# Patient Record
Sex: FEMALE | Race: BLACK OR AFRICAN AMERICAN | NOT HISPANIC OR LATINO | Employment: STUDENT | URBAN - METROPOLITAN AREA
[De-identification: names, ages, dates, MRNs, and addresses within clinical notes are randomized per-mention and may not be internally consistent; named-entity substitution may affect disease eponyms.]

---

## 2018-06-28 ENCOUNTER — OFFICE VISIT (OUTPATIENT)
Dept: OBSTETRICS AND GYNECOLOGY | Facility: CLINIC | Age: 23
End: 2018-06-28
Payer: COMMERCIAL

## 2018-06-28 VITALS
HEIGHT: 60 IN | BODY MASS INDEX: 30.92 KG/M2 | SYSTOLIC BLOOD PRESSURE: 120 MMHG | DIASTOLIC BLOOD PRESSURE: 64 MMHG | WEIGHT: 157.5 LBS

## 2018-06-28 DIAGNOSIS — N89.8 VAGINAL DISCHARGE: Primary | ICD-10-CM

## 2018-06-28 PROCEDURE — 87480 CANDIDA DNA DIR PROBE: CPT

## 2018-06-28 PROCEDURE — 87510 GARDNER VAG DNA DIR PROBE: CPT

## 2018-06-28 PROCEDURE — 3008F BODY MASS INDEX DOCD: CPT | Mod: CPTII,S$GLB,, | Performed by: NURSE PRACTITIONER

## 2018-06-28 PROCEDURE — 99999 PR PBB SHADOW E&M-NEW PATIENT-LVL III: CPT | Mod: PBBFAC,,, | Performed by: NURSE PRACTITIONER

## 2018-06-28 PROCEDURE — 99203 OFFICE O/P NEW LOW 30 MIN: CPT | Mod: S$GLB,,, | Performed by: NURSE PRACTITIONER

## 2018-06-28 RX ORDER — HYDROGEN PEROXIDE 3 %
20 SOLUTION, NON-ORAL MISCELLANEOUS
COMMUNITY
End: 2018-07-23

## 2018-06-28 RX ORDER — DICYCLOMINE HYDROCHLORIDE 10 MG/1
10 CAPSULE ORAL
COMMUNITY
End: 2018-07-23

## 2018-06-29 LAB
CANDIDA RRNA VAG QL PROBE: NEGATIVE
G VAGINALIS RRNA GENITAL QL PROBE: POSITIVE
T VAGINALIS RRNA GENITAL QL PROBE: NEGATIVE

## 2018-06-29 RX ORDER — TINIDAZOLE 500 MG/1
TABLET ORAL
Qty: 8 TABLET | Refills: 0 | Status: SHIPPED | OUTPATIENT
Start: 2018-06-29 | End: 2018-07-23

## 2018-06-29 NOTE — PROGRESS NOTES
Your vaginal swab came back (+) for Bacterial Vaginosis.  This is NOT an STD.  This is similar to a yeast infection but instead of yeast, it's an overgrowth of vaginal bacteria.  The best way to treat this is with an antibiotic.  You cannot drink alcohol while taking this medication or for 3 days after completion of medication - this is very important.    I have sent in a prescription straight to your pharmacy.  Any symptoms of vaginal odor and/or odor should go away after completion of medication.  If you have any other questions please call the office.    Sincerely,   PEPPER Rodríguez

## 2018-07-09 NOTE — PROGRESS NOTES
Chief Complaint   Patient presents with    Well Woman     last pap 10/17 neg     vaginal odor       (New patient to our practice:  Will become a Dr. Zazueta patient)    Last PAP:  10/2017   Normal,  HPV n/a      Silvia TEDDY Bose is a 23 y.o. female No obstetric history on file. presents with complaint of vaginal odor recently that has been bothersome.  She denies itching.   She states the discharge is white.  Patient's last menstrual period was 06/16/2018 (exact date)..  Has Nexplanon which was placed 12/2016 to left upper arm.  Ms. Bose is currently sexually active with a single male partner.  She declines STD screening today.    Past Medical History:   Diagnosis Date    Abnormal Pap smear of cervix     2011     Acid reflux     History of stomach ulcers     Hx of chlamydia infection      History reviewed. No pertinent surgical history.  Social History   Substance Use Topics    Smoking status: Never Smoker    Smokeless tobacco: Never Used    Alcohol use Yes     Family History   Problem Relation Age of Onset    Fibroids Mother     Breast cancer Neg Hx     Colon cancer Neg Hx     Diabetes Neg Hx     Hypertension Neg Hx     Ovarian cancer Neg Hx      OB History   No data available         ROS:  GENERAL: No fever, chills, fatigue or weight loss.  VULVAR: denies pain, denies lesions and denies itching.  VAGINAL: denies itching, denies abnormal bleeding and denies lesions.  ABDOMEN: denies abdominal pain. Denies nausea. Denies vomiting. No diarrhea. No constipation  BREAST: Denies pain. No lumps. No discharge.  URINARY: No incontinence, no nocturia, no frequency and no dysuria.  CARDIOVASCULAR: No chest pain. No shortness of breath. No leg cramps.  NEUROLOGICAL: No headaches. No vision changes.    Vitals:    06/28/18 1517   BP: 120/64   Weight: 71.5 kg (157 lb 8.3 oz)   Height: 5' (1.524 m)   PainSc: 0-No pain     Body mass index is 30.76 kg/m².    PHYSICAL EXAM:   External genitalia: normal external  genitalia, no erythema, no discharge  and urethra: normal appearing urethra with no masses, tenderness or lesions.   Vagina: normal appearing vagina with normal color and small amount white milky, malodorous discharge, no lesions.    Cervix: normal appearing cervix without discharge or lesions, cervical motion tenderness absent.   Uterus: uterus is normal size, shape, consistency and nontender, mobile. Adnexa:  normal adnexa and no mass, fullness, tenderness:  .    ASSESSMENT and PLAN:  Vaginal discharge  -     Vaginosis Screen by DNA Probe    Other orders  -     tinidazole (TINDAMAX) 500 MG tablet; Take 4 pills by mouth at once x 2 days.  Dispense: 8 tablet; Refill: 0    * Will call with results when finalized.    * Use of the [facility] Patient Portal discussed and encouraged during today's visit.       FOLLOW UP: Follow-up in about 4 weeks (around 7/26/2018) for Annual (to [facility] with Dr. Zazueta).

## 2018-07-23 ENCOUNTER — TELEPHONE (OUTPATIENT)
Dept: OBSTETRICS AND GYNECOLOGY | Facility: CLINIC | Age: 23
End: 2018-07-23

## 2018-07-23 ENCOUNTER — OFFICE VISIT (OUTPATIENT)
Dept: URGENT CARE | Facility: CLINIC | Age: 23
End: 2018-07-23
Payer: COMMERCIAL

## 2018-07-23 VITALS
DIASTOLIC BLOOD PRESSURE: 72 MMHG | WEIGHT: 160 LBS | RESPIRATION RATE: 18 BRPM | TEMPERATURE: 97 F | HEIGHT: 60 IN | BODY MASS INDEX: 31.41 KG/M2 | OXYGEN SATURATION: 100 % | SYSTOLIC BLOOD PRESSURE: 103 MMHG | HEART RATE: 83 BPM

## 2018-07-23 DIAGNOSIS — N92.6 IRREGULAR BLEEDING: Primary | ICD-10-CM

## 2018-07-23 DIAGNOSIS — S91.312A LACERATION OF LEFT FOOT, INITIAL ENCOUNTER: Primary | ICD-10-CM

## 2018-07-23 PROCEDURE — 99203 OFFICE O/P NEW LOW 30 MIN: CPT | Mod: 25,S$GLB,, | Performed by: NURSE PRACTITIONER

## 2018-07-23 PROCEDURE — 90715 TDAP VACCINE 7 YRS/> IM: CPT | Mod: S$GLB,,, | Performed by: NURSE PRACTITIONER

## 2018-07-23 PROCEDURE — 90471 IMMUNIZATION ADMIN: CPT | Mod: S$GLB,,, | Performed by: NURSE PRACTITIONER

## 2018-07-23 RX ORDER — SULFAMETHOXAZOLE AND TRIMETHOPRIM 800; 160 MG/1; MG/1
1 TABLET ORAL 2 TIMES DAILY
Qty: 20 TABLET | Refills: 0 | Status: SHIPPED | OUTPATIENT
Start: 2018-07-23 | End: 2018-08-02

## 2018-07-23 RX ORDER — MUPIROCIN 20 MG/G
OINTMENT TOPICAL 3 TIMES DAILY
Qty: 22 G | Refills: 1 | Status: SHIPPED | OUTPATIENT
Start: 2018-07-23 | End: 2018-07-26

## 2018-07-23 NOTE — PATIENT INSTRUCTIONS
USE THE BACTROBAN THREE TIMES A DAY FOR THREE DAYS THEN STOP    TAKE THE BACTRIM TWICE A DAY FOR 10 DAYS     DO NOT GO IN ANY DIRTY ENVIRONMENTS WATER OR DIRT      Old Laceration: Not Sutured  A laceration is a cut through the skin. This will usually require stitches if it is deep. However, if a laceration remains open for too long, the risk of infection increases. In your case, too much time has passed before coming for treatment. The danger of infection from suturing at this time is too high. That is why your wound was not sutured.  If the wound is spread open, it will heal by filling in from the bottom and sides. A wound that is not sutured may take 1 to 4 weeks to heal, depending on the size of the opening. A visible scar will probably occur. You can discuss revision of the scar with your healthcare provider at a later time.  Home care  The following guidelines will help you care for your laceration at home:  · Keep the wound clean and dry. If a bandage was applied and it becomes wet or dirty, replace it. Otherwise, leave it in place for the first 24 hours, then change it once a day or as directed.  · Clean the wound daily:  ¨ After removing any bandage, wash the area with soap and water. Use a wet cotton swab to loosen and remove any blood or crust that forms.  ¨ Talk with your doctor before applying any antibiotic ointment to the wound. Reapply a fresh bandage.  ¨ You may remove the bandage to shower as usual after the first 24 hours, but do not soak the area in water (no tub baths or swimming) for the next five days. Avoid activities that may reinjure your wound.  · The healthcare provider may prescribe an antibiotic cream or ointment to prevent infection. Do not stop using this medication until you have finished the prescribed course or the provider tells you to stop.  · The healthcare provider may also prescribe medications for pain. Follow instructions for taking these medications.  · Check the wound  daily for signs of infection listed below.  Follow-up care  Follow up with your healthcare provider as advised.  When to seek medical advice  Call your healthcare provider right away if any of these occur:  · Wound bleeding not controlled by direct pressure  · Signs of infection, including increasing pain in the wound, increasing wound redness or swelling, or pus or bad odor coming from the wound  · Fever of 100.4°F (38.ºC) or higher or as directed by your healthcare provider  · Wound edges re-open  · Wound changes colors  · Numbness around the wound   · Decreased movement around the injured area  Date Last Reviewed: 6/10/2015  © 3284-6220 [employer]. [address] [place] [address], [place] [address]. All rights reserved. This information is not intended as a substitute for professional medical care. Always follow your healthcare professional's instructions.

## 2018-07-23 NOTE — PROGRESS NOTES
Subjective:       Patient ID: Silvia Bose is a 23 y.o. female.    Vitals:  height is 5' (1.524 m) and weight is 72.6 kg (160 lb). Her oral temperature is 97.4 °F (36.3 °C). Her blood pressure is 103/72 and her pulse is 83. Her respiration is 18 and oxygen saturation is 100%.     Chief Complaint: Foot Injury    Patient states she stepped on something while tubing in a river and was unable to see what the object was. This occurred over 24 hours ago. This occurred on her left foot. Pt reports some TTP but denies any pain with ambulation. Last tetanus was in 2006.      Foot Injury    The incident occurred 12 to 24 hours ago. Injury mechanism: patient states she stepped on something in the water. The pain is present in the left foot. The quality of the pain is described as aching. The pain is at a severity of 0/10. The patient is experiencing no pain. Associated symptoms include an inability to bear weight. Pertinent negatives include no loss of motion, loss of sensation, muscle weakness, numbness or tingling. It is unknown if a foreign body is present. The symptoms are aggravated by weight bearing and movement. She has tried nothing for the symptoms. The treatment provided no relief.     Review of Systems   Constitution: Negative for weakness and malaise/fatigue.   HENT: Negative for nosebleeds.    Cardiovascular: Negative for chest pain and syncope.   Respiratory: Negative for shortness of breath.    Musculoskeletal: Positive for joint pain, joint swelling and stiffness. Negative for arthritis, back pain, gout and neck pain.   Gastrointestinal: Negative for abdominal pain.   Genitourinary: Negative for hematuria.   Neurological: Negative for dizziness, numbness and tingling.       Objective:      Physical Exam   Constitutional: She is oriented to person, place, and time. She appears well-developed and well-nourished.   HENT:   Head: Normocephalic and atraumatic. Head is without abrasion, without contusion and without  laceration.   Right Ear: External ear normal.   Left Ear: External ear normal.   Nose: Nose normal.   Mouth/Throat: Oropharynx is clear and moist.   Eyes: Conjunctivae, EOM and lids are normal. Pupils are equal, round, and reactive to light.   Neck: Trachea normal, full passive range of motion without pain and phonation normal. Neck supple.   Cardiovascular: Normal rate, regular rhythm and normal heart sounds.    Pulmonary/Chest: Effort normal and breath sounds normal. No stridor. No respiratory distress.   Musculoskeletal: Normal range of motion.   Neurological: She is alert and oriented to person, place, and time.   Skin: Skin is warm and dry. Capillary refill takes less than 2 seconds. Laceration noted. No abrasion, no bruising, no burn, no ecchymosis, no lesion and no rash noted. No erythema.   1.5cm linear laceration with edges well approximated with erythema and induration surrounding the lac. Laceration cleaned and dressed with bactroban   Psychiatric: She has a normal mood and affect. Her speech is normal and behavior is normal. Judgment and thought content normal. Cognition and memory are normal.   Nursing note and vitals reviewed.      Assessment:       1. Laceration of left foot, initial encounter        Plan:         Laceration of left foot, initial encounter  -     (In Office Administered) Tdap Vaccine  -     mupirocin (BACTROBAN) 2 % ointment; Apply topically 3 (three) times daily. for 3 days  Dispense: 22 g; Refill: 1  -     sulfamethoxazole-trimethoprim 800-160mg (BACTRIM DS) 800-160 mg Tab; Take 1 tablet by mouth 2 (two) times daily. for 10 days  Dispense: 20 tablet; Refill: 0      Patient Instructions         USE THE BACTROBAN THREE TIMES A DAY FOR THREE DAYS THEN STOP    TAKE THE BACTRIM TWICE A DAY FOR 10 DAYS     DO NOT GO IN ANY DIRTY ENVIRONMENTS WATER OR DIRT      Old Laceration: Not Sutured  A laceration is a cut through the skin. This will usually require stitches if it is deep. However, if  a laceration remains open for too long, the risk of infection increases. In your case, too much time has passed before coming for treatment. The danger of infection from suturing at this time is too high. That is why your wound was not sutured.  If the wound is spread open, it will heal by filling in from the bottom and sides. A wound that is not sutured may take 1 to 4 weeks to heal, depending on the size of the opening. A visible scar will probably occur. You can discuss revision of the scar with your healthcare provider at a later time.  Home care  The following guidelines will help you care for your laceration at home:  · Keep the wound clean and dry. If a bandage was applied and it becomes wet or dirty, replace it. Otherwise, leave it in place for the first 24 hours, then change it once a day or as directed.  · Clean the wound daily:  ¨ After removing any bandage, wash the area with soap and water. Use a wet cotton swab to loosen and remove any blood or crust that forms.  ¨ Talk with your doctor before applying any antibiotic ointment to the wound. Reapply a fresh bandage.  ¨ You may remove the bandage to shower as usual after the first 24 hours, but do not soak the area in water (no tub baths or swimming) for the next five days. Avoid activities that may reinjure your wound.  · The healthcare provider may prescribe an antibiotic cream or ointment to prevent infection. Do not stop using this medication until you have finished the prescribed course or the provider tells you to stop.  · The healthcare provider may also prescribe medications for pain. Follow instructions for taking these medications.  · Check the wound daily for signs of infection listed below.  Follow-up care  Follow up with your healthcare provider as advised.  When to seek medical advice  Call your healthcare provider right away if any of these occur:  · Wound bleeding not controlled by direct pressure  · Signs of infection, including  increasing pain in the wound, increasing wound redness or swelling, or pus or bad odor coming from the wound  · Fever of 100.4°F (38.ºC) or higher or as directed by your healthcare provider  · Wound edges re-open  · Wound changes colors  · Numbness around the wound   · Decreased movement around the injured area  Date Last Reviewed: 6/10/2015  © 1557-3892 [employer]. [address] [place] [address]. All rights reserved. This information is not intended as a substitute for professional medical care. Always follow your healthcare professional's instructions.

## 2018-07-23 NOTE — TELEPHONE ENCOUNTER
Pt has been on her period since 7/8.  She has had a Nexplanon for 1.5 years but has never had a period that lasted this long.  Scheduled US and appt with Dr. Zazueta 7/25.  Aware of US prep.     If she does not need an US let me know and I will call her to cancel it.     Medical Necessity Statement: Based on my medical judgement, Mohs surgery is the most appropriate treatment for this cancer compared to other treatments.

## 2018-07-25 ENCOUNTER — OFFICE VISIT (OUTPATIENT)
Dept: OBSTETRICS AND GYNECOLOGY | Facility: CLINIC | Age: 23
End: 2018-07-25
Attending: STUDENT IN AN ORGANIZED HEALTH CARE EDUCATION/TRAINING PROGRAM
Payer: COMMERCIAL

## 2018-07-25 VITALS
DIASTOLIC BLOOD PRESSURE: 76 MMHG | SYSTOLIC BLOOD PRESSURE: 124 MMHG | WEIGHT: 156.5 LBS | HEIGHT: 60 IN | BODY MASS INDEX: 30.73 KG/M2

## 2018-07-25 DIAGNOSIS — Z11.3 SCREEN FOR SEXUALLY TRANSMITTED DISEASES: Primary | ICD-10-CM

## 2018-07-25 DIAGNOSIS — N93.9 VAGINAL BLEEDING: ICD-10-CM

## 2018-07-25 LAB
CANDIDA RRNA VAG QL PROBE: NEGATIVE
G VAGINALIS RRNA GENITAL QL PROBE: NEGATIVE
T VAGINALIS RRNA GENITAL QL PROBE: NEGATIVE

## 2018-07-25 PROCEDURE — 87660 TRICHOMONAS VAGIN DIR PROBE: CPT

## 2018-07-25 PROCEDURE — 99999 PR PBB SHADOW E&M-EST. PATIENT-LVL III: CPT | Mod: PBBFAC,,, | Performed by: STUDENT IN AN ORGANIZED HEALTH CARE EDUCATION/TRAINING PROGRAM

## 2018-07-25 PROCEDURE — 87491 CHLMYD TRACH DNA AMP PROBE: CPT

## 2018-07-25 PROCEDURE — 99213 OFFICE O/P EST LOW 20 MIN: CPT | Mod: S$GLB,,, | Performed by: STUDENT IN AN ORGANIZED HEALTH CARE EDUCATION/TRAINING PROGRAM

## 2018-07-25 NOTE — PROGRESS NOTES
Chief Complaint:      HPI:  Irregular bleeding      Silvia Bose is a 23 y.o. G0 who presents today for follow up of irregular bleeding.  Patient had Nexplanon placed in 12/2016.  She reports the past 2 weeks she has had bleeding which stopped yesterday.  She reports this has never happened before.  Denies any other issues or concerns.     OB History     No data available        Past Medical History:   Diagnosis Date    Abnormal Pap smear of cervix     2011     Acid reflux     History of stomach ulcers     Hx of chlamydia infection      History reviewed. No pertinent surgical history.  Social History     Social History    Marital status: Single     Spouse name: N/A    Number of children: N/A    Years of education: N/A     Occupational History    Not on file.     Social History Main Topics    Smoking status: Never Smoker    Smokeless tobacco: Never Used    Alcohol use Yes    Drug use: No    Sexual activity: Yes     Partners: Male     Birth control/ protection: Implant     Other Topics Concern    Not on file     Social History Narrative    No narrative on file     Family History   Problem Relation Age of Onset    Fibroids Mother     Breast cancer Neg Hx     Colon cancer Neg Hx     Diabetes Neg Hx     Hypertension Neg Hx     Ovarian cancer Neg Hx        ROS:     GENERAL: Denies unintentional weight gain or weight loss. Feeling well overall.   SKIN: Denies rash or lesions.   HEENT: Denies headaches, or vision changes.   CARDIOVASCULAR: Denies palpitations or chest pain.   RESPIRATORY: Denies shortness of breath or dyspnea on exertion.  BREASTS: Denies pain, lumps, or nipple discharge.   ABDOMEN: Denies abdominal pain, constipation, diarrhea, nausea, vomiting, change in appetite.  URINARY: Denies frequency, dysuria, hematuria.  NEUROLOGIC: Denies syncope or weakness.   PSYCHIATRIC: Denies depression, anxiety or mood swings.    Physical Exam:      PHYSICAL EXAM:  /76   Ht 5' (1.524 m)   Wt 71  kg (156 lb 8.4 oz)   LMP 2018 (Approximate)   BMI 30.57 kg/m²   Body mass index is 30.57 kg/m².     APPEARANCE: Well nourished, well developed, in no acute distress.  PSYCH: Appropriate mood and affect.  SKIN: No acne or hirsutism.  NECK: Neck symmetric without masses or thyromegaly.   PELVIC: Normal external genitalia without lesions.  Normal hair distribution.  Adequate perineal body, normal urethral meatus.  Vagina moist and well rugated without lesions or discharge.  Cervix pink, without lesions, discharge or tenderness.  No significant cystocele or rectocele.  Bimanual exam shows uterus to be normal size, regular, mobile and nontender.  Adnexa without masses or tenderness.    EXTREMITIES: No edema.  No tenderness to palpation.    Labs:    FINDINGS:  Uterus:    Size: 6.2 x 3.4 x 2.8 cm    Masses: None    Endometrium: Normal in this pre menopausal patient, measuring 2 mm.    Right ovary:    Size: 3.2 x 3 x 1.3 cm    Appearance: Normal    Vascular flow: Normal.    Left ovary:    Size: 2.6 x 2.2 x 1.7 cm    Appearance: Normal    Vascular Flow: Normal.    Free Fluid:    None.   Impression       No sonographic abnormality.         Assessment/Plan:     23 y.o. with Nexplanon in place    Screen for sexually transmitted diseases  -     C. trachomatis/N. gonorrhoeae by AMP DNA Cervicovaginal  -     Vaginosis Screen by DNA Probe    Vaginal bleeding  -     C. trachomatis/N. gonorrhoeae by AMP DNA Cervicovaginal  -     Vaginosis Screen by DNA Probe          Counselin.  Irregular bleeding:  U/S results reviewed with patient.  Vaginal swabs collected and will follow up results.  Discussed further management with patient including expectant vs medical management.  This is the first time this has happened and patient desires to monitor cycles a this time.  Will call if symptoms worsen or do not improve.    2.  Follow up with PCP for other health maintenance.  3.  RTC for well woman or sooner if needed.    Use of  the [employer] [facility] Patient Portal discussed and encouraged during today's visit.

## 2018-07-26 LAB
C TRACH DNA SPEC QL NAA+PROBE: NOT DETECTED
N GONORRHOEA DNA SPEC QL NAA+PROBE: NOT DETECTED

## 2018-10-01 ENCOUNTER — OFFICE VISIT (OUTPATIENT)
Dept: OBSTETRICS AND GYNECOLOGY | Facility: CLINIC | Age: 23
End: 2018-10-01
Attending: STUDENT IN AN ORGANIZED HEALTH CARE EDUCATION/TRAINING PROGRAM
Payer: COMMERCIAL

## 2018-10-01 ENCOUNTER — LAB VISIT (OUTPATIENT)
Dept: LAB | Facility: HOSPITAL | Age: 23
End: 2018-10-01
Attending: STUDENT IN AN ORGANIZED HEALTH CARE EDUCATION/TRAINING PROGRAM
Payer: COMMERCIAL

## 2018-10-01 VITALS
DIASTOLIC BLOOD PRESSURE: 68 MMHG | WEIGHT: 150.88 LBS | HEIGHT: 60 IN | BODY MASS INDEX: 29.62 KG/M2 | SYSTOLIC BLOOD PRESSURE: 118 MMHG

## 2018-10-01 DIAGNOSIS — Z11.3 SCREENING FOR STD (SEXUALLY TRANSMITTED DISEASE): ICD-10-CM

## 2018-10-01 DIAGNOSIS — Z01.419 WELL WOMAN EXAM: ICD-10-CM

## 2018-10-01 DIAGNOSIS — Z30.9 ENCOUNTER FOR CONTRACEPTIVE MANAGEMENT, UNSPECIFIED TYPE: ICD-10-CM

## 2018-10-01 DIAGNOSIS — Z12.4 ENCOUNTER FOR SCREENING FOR CERVICAL CANCER: Primary | ICD-10-CM

## 2018-10-01 PROCEDURE — 88141 CYTOPATH C/V INTERPRET: CPT | Mod: ,,, | Performed by: PATHOLOGY

## 2018-10-01 PROCEDURE — 86703 HIV-1/HIV-2 1 RESULT ANTBDY: CPT

## 2018-10-01 PROCEDURE — 87491 CHLMYD TRACH DNA AMP PROBE: CPT

## 2018-10-01 PROCEDURE — 88175 CYTOPATH C/V AUTO FLUID REDO: CPT | Performed by: PATHOLOGY

## 2018-10-01 PROCEDURE — 86592 SYPHILIS TEST NON-TREP QUAL: CPT

## 2018-10-01 PROCEDURE — 87340 HEPATITIS B SURFACE AG IA: CPT

## 2018-10-01 PROCEDURE — 87660 TRICHOMONAS VAGIN DIR PROBE: CPT

## 2018-10-01 PROCEDURE — 99395 PREV VISIT EST AGE 18-39: CPT | Mod: S$GLB,,, | Performed by: STUDENT IN AN ORGANIZED HEALTH CARE EDUCATION/TRAINING PROGRAM

## 2018-10-01 PROCEDURE — 99999 PR PBB SHADOW E&M-EST. PATIENT-LVL III: CPT | Mod: PBBFAC,,, | Performed by: STUDENT IN AN ORGANIZED HEALTH CARE EDUCATION/TRAINING PROGRAM

## 2018-10-01 RX ORDER — ETONOGESTREL 68 MG/1
IMPLANT SUBCUTANEOUS
COMMUNITY
End: 2024-02-29 | Stop reason: ALTCHOICE

## 2018-10-01 RX ORDER — DICYCLOMINE HYDROCHLORIDE 10 MG/1
10 CAPSULE ORAL
COMMUNITY

## 2018-10-02 ENCOUNTER — PATIENT MESSAGE (OUTPATIENT)
Dept: OBSTETRICS AND GYNECOLOGY | Facility: CLINIC | Age: 23
End: 2018-10-02

## 2018-10-02 PROBLEM — Z12.4 ENCOUNTER FOR SCREENING FOR CERVICAL CANCER: Status: ACTIVE | Noted: 2018-10-02

## 2018-10-02 PROBLEM — Z01.419 WELL WOMAN EXAM: Status: ACTIVE | Noted: 2018-10-02

## 2018-10-02 PROBLEM — Z30.9 ENCOUNTER FOR CONTRACEPTIVE MANAGEMENT: Status: ACTIVE | Noted: 2018-10-02

## 2018-10-02 PROBLEM — Z11.3 SCREENING FOR STD (SEXUALLY TRANSMITTED DISEASE): Status: ACTIVE | Noted: 2018-10-02

## 2018-10-02 LAB
C TRACH DNA SPEC QL NAA+PROBE: NOT DETECTED
HBV SURFACE AG SERPL QL IA: NEGATIVE
HIV 1+2 AB+HIV1 P24 AG SERPL QL IA: NEGATIVE
N GONORRHOEA DNA SPEC QL NAA+PROBE: NOT DETECTED
RPR SER QL: NORMAL

## 2018-10-02 RX ORDER — METRONIDAZOLE 500 MG/1
500 TABLET ORAL 2 TIMES DAILY
Qty: 14 TABLET | Refills: 0 | Status: SHIPPED | OUTPATIENT
Start: 2018-10-02 | End: 2018-10-09

## 2018-10-02 NOTE — PROGRESS NOTES
Chief Complaint: Well Woman Exam     HPI:      Silvia Bose is a 23 y.o.  who presents today for well woman exam.  LMP: Patient's last menstrual period was 2018 (approximate).  No issues, problems, or complaints. Specifically, patient denies abnormal vaginal bleeding, discharge, pelvic pain, urinary problems, or changes in appetite. Ms. Bose is currently sexually active with a single male partner. She is currently using Nexplanon for contraception (placed in 2016). She would like STD screening today.    Previous Pap: No result found     OB History      Para Term  AB Living    0 0 0 0 0 0    SAB TAB Ectopic Multiple Live Births    0 0 0 0 0        Past Medical History:   Diagnosis Date    Abnormal Pap smear of cervix          Acid reflux     History of stomach ulcers     Hx of chlamydia infection      History reviewed. No pertinent surgical history.  Social History     Socioeconomic History    Marital status: Single     Spouse name: Not on file    Number of children: Not on file    Years of education: Not on file    Highest education level: Not on file   Social Needs    Financial resource strain: Not on file    Food insecurity - worry: Not on file    Food insecurity - inability: Not on file    Transportation needs - medical: Not on file    Transportation needs - non-medical: Not on file   Occupational History    Not on file   Tobacco Use    Smoking status: Never Smoker    Smokeless tobacco: Never Used   Substance and Sexual Activity    Alcohol use: Yes     Comment: social    Drug use: No    Sexual activity: Yes     Partners: Male     Birth control/protection: Implant   Other Topics Concern    Not on file   Social History Narrative    Not on file     Family History   Problem Relation Age of Onset    Fibroids Mother     Breast cancer Neg Hx     Colon cancer Neg Hx     Diabetes Neg Hx     Hypertension Neg Hx     Ovarian cancer Neg Hx        ROS:     GENERAL:  Denies unintentional weight gain or weight loss. Feeling well overall.   SKIN: Denies rash or lesions.   HEENT: Denies headaches, or vision changes.   CARDIOVASCULAR: Denies palpitations or chest pain.   RESPIRATORY: Denies shortness of breath or dyspnea on exertion.  BREASTS: Denies pain, lumps, or nipple discharge.   ABDOMEN: Denies abdominal pain, constipation, diarrhea, nausea, vomiting, change in appetite.  URINARY: Denies frequency, dysuria, hematuria.  NEUROLOGIC: Denies syncope or weakness.   PSYCHIATRIC: Denies depression, anxiety or mood swings.    Physical Exam:      PHYSICAL EXAM:  /68   Ht 5' (1.524 m)   Wt 68.5 kg (150 lb 14.5 oz)   LMP 2018 (Approximate)   BMI 29.47 kg/m²   Body mass index is 29.47 kg/m².     APPEARANCE: Well nourished, well developed, in no acute distress.  PSYCH: Appropriate mood and affect.  SKIN: No acne or hirsutism.  NECK: Neck symmetric without masses or thyromegaly.  NODES: No inguinal, axillary, or supraclavicular lymph node enlargement.  CHEST: Normal respiratory effort.    CARDIOVASCULAR:  Regular rate and rhythm.  LUNGS:  Clear to auscultation bilaterally.  BREASTS: Symmetrical, no skin changes or visible lesions.  No palpable masses or nipple discharge bilaterally.  ABDOMEN: Soft.  No tenderness or masses.    PELVIC: Normal external genitalia without lesions.  Normal hair distribution.  Adequate perineal body, normal urethral meatus.  Vagina moist and well rugated without lesions or discharge.  Cervix pink, without lesions, discharge or tenderness.  No significant cystocele or rectocele.  Bimanual exam shows uterus to be normal size, regular, mobile and nontender.  Adnexa without masses or tenderness.    EXTREMITIES: No edema.  No tenderness to palpation.    Assessment/Plan:     23 y.o.     Encounter for screening for cervical cancer   -     Liquid-based pap smear, screening    Screening for STD (sexually transmitted disease)  -     CCheri  trachomatis/N. gonorrhoeae by AMP DNA  -     Vaginosis Screen by DNA Probe  -     RPR; Future; Expected date: 10/01/2018  -     HIV-1 and HIV-2 antibodies; Future; Expected date: 10/01/2018  -     Hepatitis B surface antigen; Future; Expected date: 10/01/2018          Counselin.  Annual exam performed today without difficulty.  Patient was counseled today on current ASCCP pap guidelines, the recommendation for yearly pelvic exams, healthy diet and exercise routines, breast self awareness.  Pap smear collected and will follow up results.  All questions answered.    2.  Contraception:  Desires to continue Nexplanon.  3.  STD testing:  GC/CT and blood work.  4.  Follow up with PCP for other health maintenance.  5.  RTC in 1 year or sooner if needed.    Use of the [facility] Patient Portal discussed and encouraged during today's visit.

## 2018-10-12 ENCOUNTER — TELEPHONE (OUTPATIENT)
Dept: OBSTETRICS AND GYNECOLOGY | Facility: CLINIC | Age: 23
End: 2018-10-12

## 2018-11-13 ENCOUNTER — TELEPHONE (OUTPATIENT)
Dept: OBSTETRICS AND GYNECOLOGY | Facility: CLINIC | Age: 23
End: 2018-11-13

## 2018-11-13 NOTE — TELEPHONE ENCOUNTER
Pt's states her insurance will pay for a breast reduction but needs approval from a doctor.  She doesn't have a breast surgeon yet.  Recommended she find one and they can give her the approval for the breast reduction.

## 2018-11-26 ENCOUNTER — OFFICE VISIT (OUTPATIENT)
Dept: URGENT CARE | Facility: CLINIC | Age: 23
End: 2018-11-26
Payer: COMMERCIAL

## 2018-11-26 VITALS
RESPIRATION RATE: 19 BRPM | OXYGEN SATURATION: 100 % | HEART RATE: 89 BPM | DIASTOLIC BLOOD PRESSURE: 90 MMHG | BODY MASS INDEX: 30.43 KG/M2 | WEIGHT: 155 LBS | HEIGHT: 60 IN | TEMPERATURE: 97 F | SYSTOLIC BLOOD PRESSURE: 125 MMHG

## 2018-11-26 DIAGNOSIS — R10.11 RUQ ABDOMINAL PAIN: Primary | ICD-10-CM

## 2018-11-26 DIAGNOSIS — R11.2 NON-INTRACTABLE VOMITING WITH NAUSEA, UNSPECIFIED VOMITING TYPE: ICD-10-CM

## 2018-11-26 PROCEDURE — S0119 ONDANSETRON 4 MG: HCPCS | Mod: S$GLB,,, | Performed by: NURSE PRACTITIONER

## 2018-11-26 PROCEDURE — 99214 OFFICE O/P EST MOD 30 MIN: CPT | Mod: S$GLB,,, | Performed by: NURSE PRACTITIONER

## 2018-11-26 RX ORDER — ONDANSETRON 4 MG/1
4 TABLET, ORALLY DISINTEGRATING ORAL
Status: COMPLETED | OUTPATIENT
Start: 2018-11-26 | End: 2018-11-26

## 2018-11-26 RX ORDER — ONDANSETRON 2 MG/ML
4 INJECTION INTRAMUSCULAR; INTRAVENOUS
Status: DISCONTINUED | OUTPATIENT
Start: 2018-11-26 | End: 2018-11-26

## 2018-11-26 RX ADMIN — ONDANSETRON 4 MG: 4 TABLET, ORALLY DISINTEGRATING ORAL at 12:11

## 2018-11-26 NOTE — PROGRESS NOTES
Subjective:       Patient ID: Silvia Bose is a 23 y.o. female.    Vitals:  height is 5' (1.524 m) and weight is 70.3 kg (155 lb). Her oral temperature is 96.9 °F (36.1 °C). Her blood pressure is 125/90 (abnormal) and her pulse is 89. Her respiration is 19 and oxygen saturation is 100%.     Chief Complaint: Abdominal Pain (RUQ pain, nausea, vomiting)      Patient presents to the clinic today with complaints of extreme 10 out 10 right upper quad pain that started today.  Claims she has a history of H pylori as well as peptic ulcers.  Claims that she normally gets a Toradol injection to help with the pain.  Last menstrual period was last month was normal.  Denies any urinary symptoms.  She is also experiencing nausea and vomiting.  Denies any recent changes in her bowel habits.  Last bowel movement was yesterday and was normal.  Denies any blood in her stool.  She is extremely comfortable on exam and is rolling around the exam table.  Is requesting Toradol even though she has a history of bleeding ulcers.  Much time was spent educating the patient on proper management of her symptoms.  Given a GI cocktail in the clinic today with no relief in symptoms.  She is also given Zofran with no relief.  Due to the patient's presentation is advised that she goes to the ER.      Abdominal Pain   This is a new problem. The current episode started today. The onset quality is sudden. The problem occurs constantly. The problem has been unchanged. The pain is located in the RUQ. The pain is at a severity of 7/10. The pain is severe. The quality of the pain is aching. The abdominal pain does not radiate. Associated symptoms include nausea and vomiting. Pertinent negatives include no constipation, diarrhea, dysuria or fever. Nothing aggravates the pain. The pain is relieved by nothing. Treatments tried: bentyl. The treatment provided no relief. Her past medical history is significant for GERD. There is no history of abdominal surgery.        Constitution: Negative for appetite change, chills, sweating and fever.   HENT: Negative for trouble swallowing.    Cardiovascular: Negative for chest pain.   Respiratory: Negative for shortness of breath.    Gastrointestinal: Positive for abdominal pain, nausea and vomiting. Negative for abdominal trauma, abdominal bloating, history of abdominal surgery, constipation, diarrhea, dark colored stools and heartburn.   Genitourinary: Negative for dysuria, missed menses and pelvic pain.   Musculoskeletal: Negative for back pain.       Objective:      Physical Exam   Constitutional: She is oriented to person, place, and time. She appears well-developed and well-nourished.   HENT:   Head: Normocephalic and atraumatic.   Right Ear: External ear normal.   Left Ear: External ear normal.   Nose: Nose normal.   Mouth/Throat: Mucous membranes are normal.   Eyes: Conjunctivae and lids are normal.   Neck: Trachea normal and full passive range of motion without pain. Neck supple.   Cardiovascular: Normal rate, regular rhythm and normal heart sounds.   Pulmonary/Chest: Effort normal and breath sounds normal. No respiratory distress.   Abdominal: Soft. Normal appearance and bowel sounds are normal. She exhibits no distension, no abdominal bruit, no pulsatile midline mass and no mass. There is tenderness in the right upper quadrant.     Extremely tender right upper quadrant exam.  Abdomen is soft.  Bowel sounds present.   Musculoskeletal: Normal range of motion. She exhibits no edema.   Neurological: She is alert and oriented to person, place, and time. She has normal strength.   Skin: Skin is warm, dry and intact. She is not diaphoretic. No pallor.   Psychiatric: She has a normal mood and affect. Her speech is normal and behavior is normal. Judgment and thought content normal. Cognition and memory are normal.   Nursing note and vitals reviewed.      Assessment:       1. RUQ abdominal pain    2. Non-intractable vomiting with  nausea, unspecified vomiting type        Plan:         The patient is extremely uncomfortable on exam.  She is not participating properly throughout the exam and knows that she just needs Toradol even though she has a history of a bleeding ulcer.  She was advised to go to the ER due to the worsening pain.  Her symptoms were not improved with GI cocktail or Zofran.  Report has been called to [facility].    RUQ abdominal pain  -     (pyxis) gi cocktail (mylanta 30 mL, lidocaine 2 % viscous 10 mL, dicyclomine 10 mL) 50 mL  -     Refer to Emergency Dept.    Non-intractable vomiting with nausea, unspecified vomiting type  -     Discontinue: ondansetron injection 4 mg  -     ondansetron disintegrating tablet 4 mg      Patient Instructions     Unknown Causes of Abdominal Pain (Female)    The exact cause of your abdominal (stomach) pain is not clear. This does not mean that this is something to worry about. Everyone likes to know the exact cause of the problem, but sometimes with abdominal pain, there is no clear-cut cause, and this could be a good thing. The good news is that your symptoms can be treated, and you will feel better.   Your condition does not seem serious now; however, sometimes the signs of a serious problem may take more time to appear. For this reason, it is important for you to watch for any new symptoms, problems, or worsening of your condition.  Over the next few days, the abdominal pain may come and go, or be continuous. Other common symptoms can include nausea and vomiting. Sometimes it can be difficult to tell if you feel nauseous, you may just feel bad and not associate that feeling with nausea. Constipation, diarrhea, and a fever may go along with the pain.  The pain may continue even if treated correctly over the following days. Depending on how things go, sometimes the cause can become clear and may require further or different treatment. Additional evaluations, medications, or tests may also  be needed.  Home care  Your healthcare provider may prescribe medicine for pain, symptoms, or an infection.  Follow the healthcare provider's instructions for taking these medicines.  General care  · Rest as much as you can until your next exam. No strenuous activities.  · Try to find positions that ease discomfort. A small pillow placed on the abdomen may help relieve pain.  · Something warm on your abdomen (such as a heating pad) may help, but be careful not to burn yourself.  Diet  · Do not force yourself to eat, especially if having cramps, vomiting, or diarrhea.  · Water is important so you do not get dehydrated. Soup may also be good. Sports drinks may also help, especially if they are not too acidic. Make sure you don't drink sugary drinks as this can make things worse. Take liquids in small amounts. Do not guzzle them.  · Caffeine sometimes makes the pain and cramping worse.  · Avoid dairy products if you have vomiting or diarrhea.  · Don't eat large amounts at a time. Wait a few minutes between bites.  · Eat a diet low in fiber (called a low-residue diet). Foods allowed include refined breads, white rice, fruit and vegetable juices without pulp, tender meats. These foods will pass more easily through the intestine.  · Avoid whole-grain foods, whole fruits and vegetables, meats, seeds and nuts, fried or fatty foods, dairy, alcohol and spicy foods until your symptoms go away.  Follow-up care  Follow up with your healthcare provider, or as advised, if your pain does not begin to improve in the next 24 hours.  Call 911  Call 911 if any of these occur:  · Trouble breathing  · Confusion  · Fainting or loss of consciousness  · Rapid heart rate  · Seizure  When to seek medical advice  Call your healthcare provider right away if any of these occur:  · Pain gets worse or moves to the right lower abdomen  · New or worsening vomiting or diarrhea  · Swelling of the abdomen  · Unable to pass stool for more than 3  "days  · Fever of 100.4ºF (38ºC) or higher, or as directed by your healthcare provider.  · Blood in vomit or bowel movements (dark red or black color)  · Jaundice (yellow color of eyes and skin)  · Weakness, dizziness  · Chest, arm, back, neck or jaw pain  · Unexpected vaginal bleeding or missed period  · Can't keep down liquids or water and are getting dehydrated  Date Last Reviewed: 12/30/2015  © 8905-2217 [employer]. [address] [place] [address]. All rights reserved. This information is not intended as a substitute for professional medical care. Always follow your healthcare professional's instructions.        Vomiting (Adult)  Vomiting is a common symptom that may be due to different causes. These include gastroenteritis ("stomach flu"), food poisoning and gastritis. There are other more serious causes of vomiting which may be hard to diagnose early in the illness. Therefore, it is important to watch for the warning signs listed below.  The main danger from repeated vomiting is dehydration. This is due to excess loss of water and minerals from the body. When this occurs, body fluids must be replaced.  Home care  · If symptoms are severe, rest at home for the next 24 hours.  · Because your symptoms may be from an infection, wash your hands frequently and well, and use alcohol-based  to avoid spreading the infection to others.  · Wash your hands for at least 20 seconds. Hum the happy birthday song twice for the correct length of time.  · Wash your hands after using the toilet, before and after preparing food, before eating food, after changing a diaper, cleaning a wound, caring for a sick person, and blowing your nose, coughing, or sneezing. You should also wash your hands after caring for someone who is sick, touching pet food, or treats, and touching an animal, or animal waste.  · You may use acetaminophen or NSAID medicines like ibuprofen or naproxen to control fever, unless " another medicine was prescribed. If you have chronic liver or kidney disease or ever had a stomach ulcer or GI bleeding, talk with your doctor before using these medicines. Aspirin should never be used in anyone under 18 years of age who is ill with a fever. It may cause severe liver damage. Don't use NSAID medicines if you are already taking one for another condition (like arthritis) or are on aspirin (such as for heart disease, or after a stroke)  · Avoid tobacco and alcohol use, which may worsen your symptoms.  · If medicines for vomiting were prescribed, take as directed.  · Once vomiting stops, then follow these guidelines:  During the first 12 to 24 hours follow the diet below:  · Fruit juices. Apple, grape juice, clear fruit drinks, and electrolyte replacement drinks.  · Beverages. Soft drinks without caffeine; mineral water (plain or flavored), decaffeinated tea and coffee.  · Soups. Clear broth, consommé and bouillon  · Desserts. Plain gelatin, popsicles and fruit juice bars. As you feel better, you may add 6-8 ounces of yogurt per day.  During the next 24 hours you may add the following to the above:  · Hot cereal, plain toast, bread, rolls, crackers  · Plain noodles, rice, mashed potatoes, chicken noodle or rice soup  · Unsweetened canned fruit (avoid pineapple), bananas  · Limit caffeine and chocolate. No spices or seasonings except salt.  During the next 24 hours:  Gradually resume a normal diet, as you feel better and your symptoms lessen.  Follow-up care  Follow up with your healthcare provider, or as advised.  When to seek medical advice  Call your healthcare provider right away if any of these occur:  · Constant right-sided lower abdominal pain or increasing general abdominal pain  · Continued vomiting (unable to keep liquids down) for 24 hours  · Frequent diarrhea (more than 5 times a day); blood (red or black color) or mucus in diarrhea  · Reduced urine output or extreme thirst  · Weakness,  dizziness or fainting  · Unusually drowsy or confused  · Fever of 100.4°F (38°C) oral or higher, or as directed  · Yellow color of the eyes or skin  Date Last Reviewed: 11/16/2015  © 5451-6594 [employer]. [address] [place] [address], [place] [address]. All rights reserved. This information is not intended as a substitute for professional medical care. Always follow your healthcare professional's instructions.      -REPORT HAS BEEN CALLED TO [facility].  IT IS ADVISED THAT YOU GO TO THE ER FOR FURTHER EVALUATION.

## 2018-11-26 NOTE — PATIENT INSTRUCTIONS
Unknown Causes of Abdominal Pain (Female)    The exact cause of your abdominal (stomach) pain is not clear. This does not mean that this is something to worry about. Everyone likes to know the exact cause of the problem, but sometimes with abdominal pain, there is no clear-cut cause, and this could be a good thing. The good news is that your symptoms can be treated, and you will feel better.   Your condition does not seem serious now; however, sometimes the signs of a serious problem may take more time to appear. For this reason, it is important for you to watch for any new symptoms, problems, or worsening of your condition.  Over the next few days, the abdominal pain may come and go, or be continuous. Other common symptoms can include nausea and vomiting. Sometimes it can be difficult to tell if you feel nauseous, you may just feel bad and not associate that feeling with nausea. Constipation, diarrhea, and a fever may go along with the pain.  The pain may continue even if treated correctly over the following days. Depending on how things go, sometimes the cause can become clear and may require further or different treatment. Additional evaluations, medications, or tests may also be needed.  Home care  Your healthcare provider may prescribe medicine for pain, symptoms, or an infection.  Follow the healthcare provider's instructions for taking these medicines.  General care  · Rest as much as you can until your next exam. No strenuous activities.  · Try to find positions that ease discomfort. A small pillow placed on the abdomen may help relieve pain.  · Something warm on your abdomen (such as a heating pad) may help, but be careful not to burn yourself.  Diet  · Do not force yourself to eat, especially if having cramps, vomiting, or diarrhea.  · Water is important so you do not get dehydrated. Soup may also be good. Sports drinks may also help, especially if they are not too acidic. Make sure you don't drink  sugary drinks as this can make things worse. Take liquids in small amounts. Do not guzzle them.  · Caffeine sometimes makes the pain and cramping worse.  · Avoid dairy products if you have vomiting or diarrhea.  · Don't eat large amounts at a time. Wait a few minutes between bites.  · Eat a diet low in fiber (called a low-residue diet). Foods allowed include refined breads, white rice, fruit and vegetable juices without pulp, tender meats. These foods will pass more easily through the intestine.  · Avoid whole-grain foods, whole fruits and vegetables, meats, seeds and nuts, fried or fatty foods, dairy, alcohol and spicy foods until your symptoms go away.  Follow-up care  Follow up with your healthcare provider, or as advised, if your pain does not begin to improve in the next 24 hours.  Call 911  Call 911 if any of these occur:  · Trouble breathing  · Confusion  · Fainting or loss of consciousness  · Rapid heart rate  · Seizure  When to seek medical advice  Call your healthcare provider right away if any of these occur:  · Pain gets worse or moves to the right lower abdomen  · New or worsening vomiting or diarrhea  · Swelling of the abdomen  · Unable to pass stool for more than 3 days  · Fever of 100.4ºF (38ºC) or higher, or as directed by your healthcare provider.  · Blood in vomit or bowel movements (dark red or black color)  · Jaundice (yellow color of eyes and skin)  · Weakness, dizziness  · Chest, arm, back, neck or jaw pain  · Unexpected vaginal bleeding or missed period  · Can't keep down liquids or water and are getting dehydrated  Date Last Reviewed: 12/30/2015  © 6826-0537 [employer]. [address] [place] [address], [place] [address]. All rights reserved. This information is not intended as a substitute for professional medical care. Always follow your healthcare professional's instructions.        Vomiting (Adult)  Vomiting is a common symptom that may be due to different causes. These include  "gastroenteritis ("stomach flu"), food poisoning and gastritis. There are other more serious causes of vomiting which may be hard to diagnose early in the illness. Therefore, it is important to watch for the warning signs listed below.  The main danger from repeated vomiting is dehydration. This is due to excess loss of water and minerals from the body. When this occurs, body fluids must be replaced.  Home care  · If symptoms are severe, rest at home for the next 24 hours.  · Because your symptoms may be from an infection, wash your hands frequently and well, and use alcohol-based  to avoid spreading the infection to others.  · Wash your hands for at least 20 seconds. Hum the happy birthday song twice for the correct length of time.  · Wash your hands after using the toilet, before and after preparing food, before eating food, after changing a diaper, cleaning a wound, caring for a sick person, and blowing your nose, coughing, or sneezing. You should also wash your hands after caring for someone who is sick, touching pet food, or treats, and touching an animal, or animal waste.  · You may use acetaminophen or NSAID medicines like ibuprofen or naproxen to control fever, unless another medicine was prescribed. If you have chronic liver or kidney disease or ever had a stomach ulcer or GI bleeding, talk with your doctor before using these medicines. Aspirin should never be used in anyone under 18 years of age who is ill with a fever. It may cause severe liver damage. Don't use NSAID medicines if you are already taking one for another condition (like arthritis) or are on aspirin (such as for heart disease, or after a stroke)  · Avoid tobacco and alcohol use, which may worsen your symptoms.  · If medicines for vomiting were prescribed, take as directed.  · Once vomiting stops, then follow these guidelines:  During the first 12 to 24 hours follow the diet below:  · Fruit juices. Apple, grape juice, clear fruit " drinks, and electrolyte replacement drinks.  · Beverages. Soft drinks without caffeine; mineral water (plain or flavored), decaffeinated tea and coffee.  · Soups. Clear broth, consommé and bouillon  · Desserts. Plain gelatin, popsicles and fruit juice bars. As you feel better, you may add 6-8 ounces of yogurt per day.  During the next 24 hours you may add the following to the above:  · Hot cereal, plain toast, bread, rolls, crackers  · Plain noodles, rice, mashed potatoes, chicken noodle or rice soup  · Unsweetened canned fruit (avoid pineapple), bananas  · Limit caffeine and chocolate. No spices or seasonings except salt.  During the next 24 hours:  Gradually resume a normal diet, as you feel better and your symptoms lessen.  Follow-up care  Follow up with your healthcare provider, or as advised.  When to seek medical advice  Call your healthcare provider right away if any of these occur:  · Constant right-sided lower abdominal pain or increasing general abdominal pain  · Continued vomiting (unable to keep liquids down) for 24 hours  · Frequent diarrhea (more than 5 times a day); blood (red or black color) or mucus in diarrhea  · Reduced urine output or extreme thirst  · Weakness, dizziness or fainting  · Unusually drowsy or confused  · Fever of 100.4°F (38°C) oral or higher, or as directed  · Yellow color of the eyes or skin  Date Last Reviewed: 11/16/2015  © 0024-5587 [employer]. [address] [place] [address], [place] [address]. All rights reserved. This information is not intended as a substitute for professional medical care. Always follow your healthcare professional's instructions.      -REPORT HAS BEEN CALLED TO [facility].  IT IS ADVISED THAT YOU GO TO THE ER FOR FURTHER EVALUATION.

## 2018-12-06 ENCOUNTER — TELEPHONE (OUTPATIENT)
Dept: OBSTETRICS AND GYNECOLOGY | Facility: CLINIC | Age: 23
End: 2018-12-06

## 2018-12-06 RX ORDER — METRONIDAZOLE 7.5 MG/G
1 GEL VAGINAL NIGHTLY
Qty: 70 G | Refills: 0 | Status: SHIPPED | OUTPATIENT
Start: 2018-12-06 | End: 2022-05-17

## 2018-12-06 NOTE — TELEPHONE ENCOUNTER
Dr Zazueta pt calling, pt has been getting B.V and thinks she has it again.Pt wants to be seen to figure out why she keeps getting it.Pt # [phone]

## 2018-12-06 NOTE — TELEPHONE ENCOUNTER
Pt has BV again.  Requesting rx.  Advised if no improvement she should be seen. Requesting Metrogel.

## 2019-03-20 ENCOUNTER — TELEPHONE (OUTPATIENT)
Dept: OBSTETRICS AND GYNECOLOGY | Facility: CLINIC | Age: 24
End: 2019-03-20

## 2019-03-20 NOTE — TELEPHONE ENCOUNTER
Dr Zazueta pt calling needs another refill of Metrogel states that's B.V came back.Pt # [phone] Shan[place] # [phone]

## 2019-03-20 NOTE — TELEPHONE ENCOUNTER
Ran King pt- thinks she has Bv again. Would like Metrogel sent into pharm. C/o vaginal odor with slight discharge. Denies pain or cramping..    Allergies and Pharm UTD  Metrogel pended

## 2019-03-22 RX ORDER — METRONIDAZOLE 7.5 MG/G
1 GEL VAGINAL NIGHTLY
Qty: 70 G | Refills: 0 | Status: SHIPPED | OUTPATIENT
Start: 2019-03-22 | End: 2022-05-17

## 2019-05-01 ENCOUNTER — TELEPHONE (OUTPATIENT)
Dept: OBSTETRICS AND GYNECOLOGY | Facility: CLINIC | Age: 24
End: 2019-05-01

## 2019-05-01 NOTE — TELEPHONE ENCOUNTER
Dr Zazueta pt calling, pt has been having vaginal bleeding for about 2 1/2 weeks and wants to be seen.Pt # [phone]

## 2019-05-01 NOTE — TELEPHONE ENCOUNTER
Pt has been bleeding/spotting for 2.5 weeks.  She has the Nexplanon.   She took Ibuprofen and it didn't work like it did last time, only slowed bleeding down.  She took advil 600mg x 3 days.

## 2019-05-02 ENCOUNTER — TELEPHONE (OUTPATIENT)
Dept: OBSTETRICS AND GYNECOLOGY | Facility: CLINIC | Age: 24
End: 2019-05-02

## 2019-05-02 NOTE — TELEPHONE ENCOUNTER
Phone call made to patient to discuss.  Bleeding has now stopped.  She will call with any issues or concerns.

## 2019-05-02 NOTE — TELEPHONE ENCOUNTER
She would also like to have her Nexplanon removed early and replaced earlier secondary to scheduling.  Will check coverage.

## 2019-05-20 ENCOUNTER — TELEPHONE (OUTPATIENT)
Dept: OBSTETRICS AND GYNECOLOGY | Facility: CLINIC | Age: 24
End: 2019-05-20

## 2019-05-20 NOTE — TELEPHONE ENCOUNTER
Pt was calling to check on the status of her insurance verification for getting her nexplanon removed and replaced early.

## 2019-05-21 NOTE — TELEPHONE ENCOUNTER
L/M pt has an EPO plan with [facility].  we do not have a [place] in [place] and this is not a PPO plan, therefore this patient will need to get a referral for us to see her for anything. Pt needs to contact her [employer] company.

## 2019-07-08 ENCOUNTER — TELEPHONE (OUTPATIENT)
Dept: OBSTETRICS AND GYNECOLOGY | Facility: CLINIC | Age: 24
End: 2019-07-08

## 2019-07-08 NOTE — TELEPHONE ENCOUNTER
----- Message from Linda Zazueta MD sent at 7/5/2019  6:35 PM CDT -----  Please call to schedule follow up in 10/2019.  Thanks!!  ----- Message -----  From: Linda Zazueta MD  Sent: 10/12/2018   4:21 PM  To: Linda Zazueta MD    Repeatp pap in 2-4 months.

## 2019-07-08 NOTE — TELEPHONE ENCOUNTER
Spoke with the patient. Informed her that an appointment for follow up is needed in October. Patient states she will call back on a later date to schedule appointment.

## 2019-07-23 ENCOUNTER — TELEPHONE (OUTPATIENT)
Dept: OBSTETRICS AND GYNECOLOGY | Facility: CLINIC | Age: 24
End: 2019-07-23

## 2019-07-23 NOTE — TELEPHONE ENCOUNTER
Dr Zazueta pt calling, wants to know how long does the Nexplanon good for once inserted.Pt # [phone]

## 2019-07-23 NOTE — TELEPHONE ENCOUNTER
Spoke with pt and explained that I sent her information to central pricing and they will contact her with an estimated cost

## 2019-07-23 NOTE — TELEPHONE ENCOUNTER
Dr. Narayan pt, noticed from a card she found today that her Nexplanon  2 days ago and would like to know the out of pocket charge for the IUD and how much would procedure cost for re-insertion. Please call pt and advise, thank you

## 2021-06-08 ENCOUNTER — OFFICE VISIT (OUTPATIENT)
Dept: URGENT CARE | Facility: CLINIC | Age: 26
End: 2021-06-08
Payer: MEDICAID

## 2021-06-08 VITALS
TEMPERATURE: 98 F | RESPIRATION RATE: 16 BRPM | SYSTOLIC BLOOD PRESSURE: 112 MMHG | HEART RATE: 76 BPM | OXYGEN SATURATION: 99 % | DIASTOLIC BLOOD PRESSURE: 79 MMHG

## 2021-06-08 DIAGNOSIS — U07.1 COVID-19 VIRUS DETECTED: ICD-10-CM

## 2021-06-08 DIAGNOSIS — R48.1 LOSS OF PERCEPTION FOR TASTE: Primary | ICD-10-CM

## 2021-06-08 LAB
CTP QC/QA: YES
SARS-COV-2 RDRP RESP QL NAA+PROBE: POSITIVE

## 2021-06-08 PROCEDURE — 87635 PR SARS-COV-2 COVID-19 AMPLIFIED PROBE: ICD-10-PCS | Mod: QW,S$GLB,, | Performed by: NURSE PRACTITIONER

## 2021-06-08 PROCEDURE — 87635 SARS-COV-2 COVID-19 AMP PRB: CPT | Mod: QW,S$GLB,, | Performed by: NURSE PRACTITIONER

## 2021-06-08 PROCEDURE — 99213 PR OFFICE/OUTPT VISIT, EST, LEVL III, 20-29 MIN: ICD-10-PCS | Mod: S$GLB,,, | Performed by: NURSE PRACTITIONER

## 2021-06-08 PROCEDURE — 99213 OFFICE O/P EST LOW 20 MIN: CPT | Mod: S$GLB,,, | Performed by: NURSE PRACTITIONER

## 2021-12-16 ENCOUNTER — OFFICE VISIT (OUTPATIENT)
Dept: URGENT CARE | Facility: CLINIC | Age: 26
End: 2021-12-16
Payer: MEDICAID

## 2021-12-16 VITALS
HEART RATE: 80 BPM | BODY MASS INDEX: 34.16 KG/M2 | SYSTOLIC BLOOD PRESSURE: 111 MMHG | HEIGHT: 60 IN | DIASTOLIC BLOOD PRESSURE: 71 MMHG | TEMPERATURE: 98 F | OXYGEN SATURATION: 100 % | RESPIRATION RATE: 18 BRPM | WEIGHT: 174 LBS

## 2021-12-16 DIAGNOSIS — Z20.822 COVID-19 RULED OUT BY LABORATORY TESTING: Primary | ICD-10-CM

## 2021-12-16 DIAGNOSIS — Z11.52 ENCOUNTER FOR SCREENING FOR COVID-19: ICD-10-CM

## 2021-12-16 LAB
CTP QC/QA: YES
SARS-COV-2 RDRP RESP QL NAA+PROBE: NEGATIVE

## 2021-12-16 PROCEDURE — 99213 PR OFFICE/OUTPT VISIT, EST, LEVL III, 20-29 MIN: ICD-10-PCS | Mod: S$GLB,,, | Performed by: NURSE PRACTITIONER

## 2021-12-16 PROCEDURE — U0002 COVID-19 LAB TEST NON-CDC: HCPCS | Mod: QW,S$GLB,, | Performed by: NURSE PRACTITIONER

## 2021-12-16 PROCEDURE — 99213 OFFICE O/P EST LOW 20 MIN: CPT | Mod: S$GLB,,, | Performed by: NURSE PRACTITIONER

## 2021-12-16 PROCEDURE — U0002: ICD-10-PCS | Mod: QW,S$GLB,, | Performed by: NURSE PRACTITIONER

## 2022-03-28 ENCOUNTER — TELEPHONE (OUTPATIENT)
Dept: ORTHOPEDICS | Facility: CLINIC | Age: 27
End: 2022-03-28
Payer: MEDICAID

## 2022-03-28 NOTE — TELEPHONE ENCOUNTER
----- Message from Sujatha Servin sent at 3/28/2022  9:55 AM CDT -----  Regarding: Reqesting a call back  Contact: FAINA HOLLINS [[id number]]  Name of Who is Calling:  FAINA HOLLINS [[id number]]        What is the request in detail: Pt states she wants a consult with this provider, she states she is looking for a breast reduction. Please advise se is requesting a call back           Can the clinic reply by [facility]:No           [facility]:[phone]

## 2022-03-28 NOTE — TELEPHONE ENCOUNTER
Spoke with pt and she stated her [facility].  Pt was informed I left her a message with Dr Kaba's plastic office number on her voicemail.  Pt understood.

## 2022-03-28 NOTE — TELEPHONE ENCOUNTER
----- Message from Norma Ziegler sent at 3/28/2022  2:05 PM CDT -----  Regarding: appt  Contact: pt @ [phone]  Pt calling to speak with someone in 's office regarding scheduling surgery. Please call.

## 2022-03-28 NOTE — TELEPHONE ENCOUNTER
LMV informing pt that her message was sent to the wrong office.  Pt was given phone number to Dr Kaba's plastic office.

## 2022-05-17 ENCOUNTER — OFFICE VISIT (OUTPATIENT)
Dept: URGENT CARE | Facility: CLINIC | Age: 27
End: 2022-05-17
Payer: MEDICAID

## 2022-05-17 VITALS
WEIGHT: 178 LBS | BODY MASS INDEX: 34.95 KG/M2 | SYSTOLIC BLOOD PRESSURE: 112 MMHG | OXYGEN SATURATION: 100 % | RESPIRATION RATE: 20 BRPM | HEART RATE: 76 BPM | DIASTOLIC BLOOD PRESSURE: 73 MMHG | HEIGHT: 60 IN | TEMPERATURE: 98 F

## 2022-05-17 DIAGNOSIS — N76.0 BACTERIAL VAGINOSIS: ICD-10-CM

## 2022-05-17 DIAGNOSIS — N89.8 VAGINAL ITCHING: Primary | ICD-10-CM

## 2022-05-17 DIAGNOSIS — N89.8 VAGINAL DISCHARGE: ICD-10-CM

## 2022-05-17 DIAGNOSIS — B96.89 BACTERIAL VAGINOSIS: ICD-10-CM

## 2022-05-17 LAB
BILIRUB UR QL STRIP: NEGATIVE
GLUCOSE UR QL STRIP: NEGATIVE
KETONES UR QL STRIP: NEGATIVE
LEUKOCYTE ESTERASE UR QL STRIP: NEGATIVE
PH, POC UA: 7
POC BLOOD, URINE: NEGATIVE
POC NITRATES, URINE: NEGATIVE
PROT UR QL STRIP: NEGATIVE
SP GR UR STRIP: 1.01 (ref 1–1.03)
UROBILINOGEN UR STRIP-ACNC: NORMAL (ref 0.1–1.1)

## 2022-05-17 PROCEDURE — 3074F PR MOST RECENT SYSTOLIC BLOOD PRESSURE < 130 MM HG: ICD-10-PCS | Mod: CPTII,S$GLB,, | Performed by: NURSE PRACTITIONER

## 2022-05-17 PROCEDURE — 3008F PR BODY MASS INDEX (BMI) DOCUMENTED: ICD-10-PCS | Mod: CPTII,S$GLB,, | Performed by: NURSE PRACTITIONER

## 2022-05-17 PROCEDURE — 1159F PR MEDICATION LIST DOCUMENTED IN MEDICAL RECORD: ICD-10-PCS | Mod: CPTII,S$GLB,, | Performed by: NURSE PRACTITIONER

## 2022-05-17 PROCEDURE — 3008F BODY MASS INDEX DOCD: CPT | Mod: CPTII,S$GLB,, | Performed by: NURSE PRACTITIONER

## 2022-05-17 PROCEDURE — 81003 URINALYSIS AUTO W/O SCOPE: CPT | Mod: QW,S$GLB,, | Performed by: NURSE PRACTITIONER

## 2022-05-17 PROCEDURE — 1160F PR REVIEW ALL MEDS BY PRESCRIBER/CLIN PHARMACIST DOCUMENTED: ICD-10-PCS | Mod: CPTII,S$GLB,, | Performed by: NURSE PRACTITIONER

## 2022-05-17 PROCEDURE — 81003 POCT URINALYSIS, DIPSTICK, AUTOMATED, W/O SCOPE: ICD-10-PCS | Mod: QW,S$GLB,, | Performed by: NURSE PRACTITIONER

## 2022-05-17 PROCEDURE — 1159F MED LIST DOCD IN RCRD: CPT | Mod: CPTII,S$GLB,, | Performed by: NURSE PRACTITIONER

## 2022-05-17 PROCEDURE — 3078F PR MOST RECENT DIASTOLIC BLOOD PRESSURE < 80 MM HG: ICD-10-PCS | Mod: CPTII,S$GLB,, | Performed by: NURSE PRACTITIONER

## 2022-05-17 PROCEDURE — 1160F RVW MEDS BY RX/DR IN RCRD: CPT | Mod: CPTII,S$GLB,, | Performed by: NURSE PRACTITIONER

## 2022-05-17 PROCEDURE — 99204 PR OFFICE/OUTPT VISIT, NEW, LEVL IV, 45-59 MIN: ICD-10-PCS | Mod: S$GLB,,, | Performed by: NURSE PRACTITIONER

## 2022-05-17 PROCEDURE — 3078F DIAST BP <80 MM HG: CPT | Mod: CPTII,S$GLB,, | Performed by: NURSE PRACTITIONER

## 2022-05-17 PROCEDURE — 3074F SYST BP LT 130 MM HG: CPT | Mod: CPTII,S$GLB,, | Performed by: NURSE PRACTITIONER

## 2022-05-17 PROCEDURE — 99204 OFFICE O/P NEW MOD 45 MIN: CPT | Mod: S$GLB,,, | Performed by: NURSE PRACTITIONER

## 2022-05-17 RX ORDER — METRONIDAZOLE 500 MG/1
500 TABLET ORAL EVERY 12 HOURS
Qty: 14 TABLET | Refills: 0 | Status: SHIPPED | OUTPATIENT
Start: 2022-05-17 | End: 2022-05-17

## 2022-05-17 RX ORDER — METRONIDAZOLE 500 MG/1
500 TABLET ORAL EVERY 12 HOURS
Qty: 14 TABLET | Refills: 0 | Status: SHIPPED | OUTPATIENT
Start: 2022-05-17 | End: 2022-05-24

## 2022-05-17 NOTE — PROGRESS NOTES
Subjective:       Patient ID: Silvia Bose is a 26 y.o. female.    Vitals:  height is 5' (1.524 m) and weight is 80.7 kg (178 lb). Her oral temperature is 97.7 °F (36.5 °C). Her blood pressure is 112/73 and her pulse is 76. Her respiration is 20 and oxygen saturation is 100%.     Chief Complaint: Vaginal Itching    Pt states she has symptoms of discharge, itching. possibly brought on by new birth control or sexual activity.    Vaginal Itching  The patient's primary symptoms include vaginal discharge. This is a new problem. The current episode started yesterday. The problem has been unchanged. She is sexually active.       Genitourinary: Positive for vaginal discharge and vaginal odor.       Objective:      Physical Exam   Constitutional: She is oriented to person, place, and time.   HENT:   Head: Normocephalic and atraumatic.   Nose: Nose normal.   Mouth/Throat: Oropharynx is clear.   Eyes: Conjunctivae are normal.   Cardiovascular: Regular rhythm and normal heart sounds.   Pulmonary/Chest: Breath sounds normal.   Abdominal: Normal appearance. Soft.   Musculoskeletal: Normal range of motion.         General: Normal range of motion.   Neurological: She is alert and oriented to person, place, and time.   Skin: Skin is warm and dry. Capillary refill takes 2 to 3 seconds.   Psychiatric: Her behavior is normal. Mood normal.   Nursing note and vitals reviewed.        Assessment:       1. Vaginal itching    2. Vaginal discharge    3. Bacterial vaginosis      UA: WNL    Nuswab pending     Plan:       Patient recently had nexplenon removed and started oral contraceptive. She reports thick pink discharge with strong odor. She reports frequent BV infections while taking oral contraceptive in the past. Will treat for BV while awaiting NuSwab results.   Vaginal itching  -     POCT Urinalysis, Dipstick, Automated, W/O Scope  -     NuSwab Vaginitis Plus (VG+)    Vaginal discharge    Bacterial vaginosis  -     metroNIDAZOLE  (FLAGYL) 500 MG tablet; Take 1 tablet (500 mg total) by mouth every 12 (twelve) hours. for 7 days  Dispense: 14 tablet; Refill: 0    Metronidazole 500mg twice daily with food x 7 days-DO NOT DRINK ALCOHOL WHILE TAKING  We will call you with Nuswab results in 3-4 days  Follow up if symptoms persist or worsen

## 2022-05-17 NOTE — PATIENT INSTRUCTIONS
Metronidazole 500mg twice daily with food x 7 days-DO NOT DRINK ALCOHOL WHILE TAKING  We will call you with Nuswab results in 3-4 days  Follow up if symptoms persist or worsen

## 2022-05-23 LAB
A VAGINAE DNA VAG QL NAA+PROBE: ABNORMAL SCORE
BVAB2 DNA VAG QL NAA+PROBE: ABNORMAL SCORE
C ALBICANS DNA VAG QL NAA+PROBE: POSITIVE
C GLABRATA DNA VAG QL NAA+PROBE: NEGATIVE
C TRACH DNA VAG QL NAA+PROBE: NEGATIVE
MEGA1 DNA VAG QL NAA+PROBE: ABNORMAL SCORE
N GONORRHOEA DNA VAG QL NAA+PROBE: NEGATIVE
T VAGINALIS DNA VAG QL NAA+PROBE: NEGATIVE

## 2022-05-24 ENCOUNTER — TELEPHONE (OUTPATIENT)
Dept: URGENT CARE | Facility: CLINIC | Age: 27
End: 2022-05-24
Payer: MEDICAID

## 2022-05-24 DIAGNOSIS — B37.9 CANDIDIASIS: Primary | ICD-10-CM

## 2022-05-24 RX ORDER — FLUCONAZOLE 100 MG/1
100 TABLET ORAL EVERY OTHER DAY
Qty: 3 TABLET | Refills: 0 | Status: SHIPPED | OUTPATIENT
Start: 2022-05-24 | End: 2024-02-29

## 2022-05-24 NOTE — TELEPHONE ENCOUNTER
Called patient to discuss Nuswab positive for yeast. Patient completed treatment for BV. Advised to take fluconazole every other day x 3.She verbalized understanding.

## 2022-05-26 ENCOUNTER — TELEPHONE (OUTPATIENT)
Dept: URGENT CARE | Facility: CLINIC | Age: 27
End: 2022-05-26
Payer: MEDICAID

## 2022-05-26 NOTE — TELEPHONE ENCOUNTER
----- Message from Mattie Diggs NP sent at 5/23/2022  8:01 PM CDT -----  Please inform the patient that her vaginal swab came back negative for Trichomonas, chlamydia, gonorrhea however did come back positive for bacterial vaginosis and yeast.  Report inform her that she received treatment at the office for the bacterial vaginosis as long she completed the Flagyl prescription and that she can use any over-the-counter 3 day vaginal yeast cream

## 2022-09-26 ENCOUNTER — OFFICE VISIT (OUTPATIENT)
Dept: URGENT CARE | Facility: CLINIC | Age: 27
End: 2022-09-26
Payer: MEDICAID

## 2022-09-26 VITALS
DIASTOLIC BLOOD PRESSURE: 83 MMHG | OXYGEN SATURATION: 100 % | WEIGHT: 175 LBS | SYSTOLIC BLOOD PRESSURE: 117 MMHG | TEMPERATURE: 97 F | BODY MASS INDEX: 34.36 KG/M2 | HEIGHT: 60 IN | RESPIRATION RATE: 18 BRPM | HEART RATE: 72 BPM

## 2022-09-26 DIAGNOSIS — Z20.2 POSSIBLE EXPOSURE TO STD: Primary | ICD-10-CM

## 2022-09-26 DIAGNOSIS — Z20.2 EXPOSURE TO STD: ICD-10-CM

## 2022-09-26 LAB
B-HCG UR QL: NEGATIVE
BILIRUB UR QL STRIP: NEGATIVE
CTP QC/QA: YES
GLUCOSE UR QL STRIP: NEGATIVE
KETONES UR QL STRIP: NEGATIVE
LEUKOCYTE ESTERASE UR QL STRIP: NEGATIVE
PH, POC UA: 6
POC BLOOD, URINE: NEGATIVE
POC NITRATES, URINE: NEGATIVE
PROT UR QL STRIP: NEGATIVE
SP GR UR STRIP: 1.02 (ref 1–1.03)
UROBILINOGEN UR STRIP-ACNC: NORMAL (ref 0.1–1.1)

## 2022-09-26 PROCEDURE — 1159F MED LIST DOCD IN RCRD: CPT | Mod: CPTII,S$GLB,,

## 2022-09-26 PROCEDURE — 1159F PR MEDICATION LIST DOCUMENTED IN MEDICAL RECORD: ICD-10-PCS | Mod: CPTII,S$GLB,,

## 2022-09-26 PROCEDURE — 3079F PR MOST RECENT DIASTOLIC BLOOD PRESSURE 80-89 MM HG: ICD-10-PCS | Mod: CPTII,S$GLB,,

## 2022-09-26 PROCEDURE — 3074F SYST BP LT 130 MM HG: CPT | Mod: CPTII,S$GLB,,

## 2022-09-26 PROCEDURE — 3008F BODY MASS INDEX DOCD: CPT | Mod: CPTII,S$GLB,,

## 2022-09-26 PROCEDURE — 81003 URINALYSIS AUTO W/O SCOPE: CPT | Mod: QW,S$GLB,,

## 2022-09-26 PROCEDURE — 99213 OFFICE O/P EST LOW 20 MIN: CPT | Mod: S$GLB,,,

## 2022-09-26 PROCEDURE — 3008F PR BODY MASS INDEX (BMI) DOCUMENTED: ICD-10-PCS | Mod: CPTII,S$GLB,,

## 2022-09-26 PROCEDURE — 81025 URINE PREGNANCY TEST: CPT | Mod: S$GLB,,,

## 2022-09-26 PROCEDURE — 3074F PR MOST RECENT SYSTOLIC BLOOD PRESSURE < 130 MM HG: ICD-10-PCS | Mod: CPTII,S$GLB,,

## 2022-09-26 PROCEDURE — 1160F RVW MEDS BY RX/DR IN RCRD: CPT | Mod: CPTII,S$GLB,,

## 2022-09-26 PROCEDURE — 99213 PR OFFICE/OUTPT VISIT, EST, LEVL III, 20-29 MIN: ICD-10-PCS | Mod: S$GLB,,,

## 2022-09-26 PROCEDURE — 81003 POCT URINALYSIS, DIPSTICK, AUTOMATED, W/O SCOPE: ICD-10-PCS | Mod: QW,S$GLB,,

## 2022-09-26 PROCEDURE — 3079F DIAST BP 80-89 MM HG: CPT | Mod: CPTII,S$GLB,,

## 2022-09-26 PROCEDURE — 81025 POCT URINE PREGNANCY: ICD-10-PCS | Mod: S$GLB,,,

## 2022-09-26 PROCEDURE — 1160F PR REVIEW ALL MEDS BY PRESCRIBER/CLIN PHARMACIST DOCUMENTED: ICD-10-PCS | Mod: CPTII,S$GLB,,

## 2022-09-26 NOTE — PATIENT INSTRUCTIONS
Follow up with PCP to have further STD testing.     HSV 1 and HSV 2 lab work should be back within 1 week.

## 2022-09-26 NOTE — PROGRESS NOTES
"Subjective:       Patient ID: Silvia Bose is a 27 y.o. female.    Vitals:  height is 5' (1.524 m) and weight is 79.4 kg (175 lb). Her oral temperature is 97.4 °F (36.3 °C). Her blood pressure is 117/83 and her pulse is 72. Her respiration is 18 and oxygen saturation is 100%.     Chief Complaint: Exposure to STD    Pt states "she thinks she may have been exposed to herpes 5 months ago."    Exposure to STD   Pertinent negatives include no diaphoresis.   Constitution: Negative for activity change, appetite change, chills and sweating.   Neck: Negative for neck pain.   Cardiovascular:  Negative for chest pain.   Genitourinary:  Negative for vaginal pain and vaginal discharge.   Skin:  Negative for rash and lesion.     Objective:      Physical Exam   Constitutional: She is oriented to person, place, and time. She appears well-developed. No distress.   HENT:   Head: Normocephalic.   Eyes: EOM are normal.   Cardiovascular: Normal rate.   Pulmonary/Chest: Effort normal.   Neurological: She is alert and oriented to person, place, and time.   Skin: Skin is warm, dry and not diaphoretic. Capillary refill takes 2 to 3 seconds.       Assessment:       1. Possible exposure to STD    2. Exposure to STD          Plan:         Possible exposure to STD  -     POCT Urinalysis, Dipstick, Automated, W/O Scope  -     POCT urine pregnancy  -     HERPES SIMPLEX 1 & 2 IGM; Future; Expected date: 09/26/2022  -     HERPES SIMPLEX 1&2 IGG; Future; Expected date: 09/26/2022    Exposure to STD  -     HERPES SIMPLEX 1 & 2 IGM; Future; Expected date: 09/26/2022  -     HERPES SIMPLEX 1&2 IGG; Future; Expected date: 09/26/2022       Patient presents with concern of exposure to herpes, she has no lesions or open sores, Denies any vaginal/urinary symptoms, she is requesting testing for herpes specifically, HSV 1 and HSV 2 labs sent to [facility]. Instructed to fu with her PCP for further std testing.             "

## 2022-09-30 LAB
HSV1 IGG SER IA-ACNC: <0.91 INDEX (ref 0–0.9)
HSV2 IGG SER IA-ACNC: <0.91 INDEX (ref 0–0.9)

## 2023-01-24 ENCOUNTER — TELEPHONE (OUTPATIENT)
Dept: OBSTETRICS AND GYNECOLOGY | Facility: CLINIC | Age: 28
End: 2023-01-24
Payer: MEDICAID

## 2023-01-24 NOTE — TELEPHONE ENCOUNTER
----- Message from Gabriel Arguelles sent at 1/24/2023  9:37 AM CST -----  Contact: Self  Type:  Same Day Appointment Request    Caller is requesting a same day appointment.  Caller declined first available appointment listed below.      Name of Caller:  Patient  When is the first available appointment?  N/a  Symptoms:  np, vaginal pain  Best Call Back Number:  [phone]   Additional Information:

## 2023-01-25 ENCOUNTER — LAB VISIT (OUTPATIENT)
Dept: LAB | Facility: HOSPITAL | Age: 28
End: 2023-01-25
Attending: SPECIALIST
Payer: MEDICAID

## 2023-01-25 ENCOUNTER — HOSPITAL ENCOUNTER (OUTPATIENT)
Dept: RADIOLOGY | Facility: HOSPITAL | Age: 28
Discharge: HOME | End: 2023-01-25
Attending: SPECIALIST
Payer: MEDICAID

## 2023-01-25 ENCOUNTER — OFFICE VISIT (OUTPATIENT)
Dept: OBSTETRICS AND GYNECOLOGY | Facility: CLINIC | Age: 28
End: 2023-01-25
Payer: MEDICAID

## 2023-01-25 VITALS
HEIGHT: 60 IN | WEIGHT: 169.31 LBS | BODY MASS INDEX: 33.24 KG/M2 | DIASTOLIC BLOOD PRESSURE: 84 MMHG | SYSTOLIC BLOOD PRESSURE: 120 MMHG

## 2023-01-25 DIAGNOSIS — Z12.4 ENCOUNTER FOR PAP SMEAR OF CERVIX WITH HPV DNA COTESTING: ICD-10-CM

## 2023-01-25 DIAGNOSIS — R10.2 PELVIC PAIN: ICD-10-CM

## 2023-01-25 DIAGNOSIS — Z11.3 SCREEN FOR STD (SEXUALLY TRANSMITTED DISEASE): ICD-10-CM

## 2023-01-25 DIAGNOSIS — Z11.3 SCREEN FOR STD (SEXUALLY TRANSMITTED DISEASE): Primary | ICD-10-CM

## 2023-01-25 LAB
HIV 1+2 AB+HIV1 P24 AG SERPL QL IA: NORMAL
RPR SER QL: NORMAL

## 2023-01-25 PROCEDURE — 86694 HERPES SIMPLEX NES ANTBDY: CPT | Performed by: SPECIALIST

## 2023-01-25 PROCEDURE — 3079F PR MOST RECENT DIASTOLIC BLOOD PRESSURE 80-89 MM HG: ICD-10-PCS | Mod: CPTII,,, | Performed by: SPECIALIST

## 2023-01-25 PROCEDURE — 76830 TRANSVAGINAL US NON-OB: CPT | Mod: 26,,, | Performed by: RADIOLOGY

## 2023-01-25 PROCEDURE — 88175 CYTOPATH C/V AUTO FLUID REDO: CPT | Performed by: SPECIALIST

## 2023-01-25 PROCEDURE — 76856 US PELVIS COMP WITH TRANSVAG NON-OB (XPD): ICD-10-PCS | Mod: 26,,, | Performed by: RADIOLOGY

## 2023-01-25 PROCEDURE — 76856 US EXAM PELVIC COMPLETE: CPT | Mod: 26,,, | Performed by: RADIOLOGY

## 2023-01-25 PROCEDURE — 99999 PR PBB SHADOW E&M-EST. PATIENT-LVL III: CPT | Mod: PBBFAC,,, | Performed by: SPECIALIST

## 2023-01-25 PROCEDURE — 80074 ACUTE HEPATITIS PANEL: CPT | Performed by: SPECIALIST

## 2023-01-25 PROCEDURE — 87389 HIV-1 AG W/HIV-1&-2 AB AG IA: CPT | Performed by: SPECIALIST

## 2023-01-25 PROCEDURE — 76856 US EXAM PELVIC COMPLETE: CPT | Mod: TC

## 2023-01-25 PROCEDURE — 3074F PR MOST RECENT SYSTOLIC BLOOD PRESSURE < 130 MM HG: ICD-10-PCS | Mod: CPTII,,, | Performed by: SPECIALIST

## 2023-01-25 PROCEDURE — 99213 OFFICE O/P EST LOW 20 MIN: CPT | Mod: PBBFAC,PN | Performed by: SPECIALIST

## 2023-01-25 PROCEDURE — 86592 SYPHILIS TEST NON-TREP QUAL: CPT | Performed by: SPECIALIST

## 2023-01-25 PROCEDURE — 87624 HPV HI-RISK TYP POOLED RSLT: CPT | Performed by: SPECIALIST

## 2023-01-25 PROCEDURE — 99999 PR PBB SHADOW E&M-EST. PATIENT-LVL III: ICD-10-PCS | Mod: PBBFAC,,, | Performed by: SPECIALIST

## 2023-01-25 PROCEDURE — 3008F PR BODY MASS INDEX (BMI) DOCUMENTED: ICD-10-PCS | Mod: CPTII,,, | Performed by: SPECIALIST

## 2023-01-25 PROCEDURE — 36415 COLL VENOUS BLD VENIPUNCTURE: CPT | Mod: PN | Performed by: SPECIALIST

## 2023-01-25 PROCEDURE — 3008F BODY MASS INDEX DOCD: CPT | Mod: CPTII,,, | Performed by: SPECIALIST

## 2023-01-25 PROCEDURE — 1159F PR MEDICATION LIST DOCUMENTED IN MEDICAL RECORD: ICD-10-PCS | Mod: CPTII,,, | Performed by: SPECIALIST

## 2023-01-25 PROCEDURE — 76830 US PELVIS COMP WITH TRANSVAG NON-OB (XPD): ICD-10-PCS | Mod: 26,,, | Performed by: RADIOLOGY

## 2023-01-25 PROCEDURE — 99385 PREV VISIT NEW AGE 18-39: CPT | Mod: S$PBB,,, | Performed by: SPECIALIST

## 2023-01-25 PROCEDURE — 3079F DIAST BP 80-89 MM HG: CPT | Mod: CPTII,,, | Performed by: SPECIALIST

## 2023-01-25 PROCEDURE — 1159F MED LIST DOCD IN RCRD: CPT | Mod: CPTII,,, | Performed by: SPECIALIST

## 2023-01-25 PROCEDURE — 3074F SYST BP LT 130 MM HG: CPT | Mod: CPTII,,, | Performed by: SPECIALIST

## 2023-01-25 PROCEDURE — 86695 HERPES SIMPLEX TYPE 1 TEST: CPT | Performed by: SPECIALIST

## 2023-01-25 PROCEDURE — 87591 N.GONORRHOEAE DNA AMP PROB: CPT | Performed by: SPECIALIST

## 2023-01-25 PROCEDURE — 99385 PR PREVENTIVE VISIT,NEW,18-39: ICD-10-PCS | Mod: S$PBB,,, | Performed by: SPECIALIST

## 2023-01-25 NOTE — PROGRESS NOTES
"  28 yo anxious BF G0 presents for gyn eval  In addition, c/o 'vaginal pointing /pain sensation" over several days primarily right sided denies overt d/c, DUB, c/d, dysuria, hematuria,weight loss/gain    Past Medical History:   Diagnosis Date    Abnormal Pap smear of cervix     2011     Acid reflux     History of stomach ulcers     Hx of chlamydia infection        No past surgical history on file.    Family History   Problem Relation Age of Onset    Fibroids Mother     Breast cancer Neg Hx     Colon cancer Neg Hx     Diabetes Neg Hx     Hypertension Neg Hx     Ovarian cancer Neg Hx        Social History     Socioeconomic History    Marital status: Single   Tobacco Use    Smoking status: Never    Smokeless tobacco: Never   Substance and Sexual Activity    Alcohol use: Yes     Comment: social    Drug use: No    Sexual activity: Yes     Partners: Male     Birth control/protection: Implant       Current Outpatient Medications   Medication Sig Dispense Refill    dicyclomine (BENTYL) 10 MG capsule Take 10 mg by mouth 4 (four) times daily before meals and nightly.      fluconazole (DIFLUCAN) 100 MG tablet Take 1 tablet (100 mg total) by mouth every other day. 3 tablet 0    etonogestrel (NEXPLANON) 68 mg Impl by Subdermal route.       No current facility-administered medications for this visit.       Review of patient's allergies indicates:  No Known Allergies    Review of System:   General: no chills, fever, night sweats, weight gain or weight loss  Psychological: no depression or suicidal ideation  Breasts: no new or changing breast lumps, nipple discharge or masses.  Respiratory: no cough, shortness of breath, or wheezing  Cardiovascular: no chest pain or dyspnea on exertion  Gastrointestinal: no abdominal pain, change in bowel habits, or black or bloody stools  Genito-Urinary: as above  Musculoskeletal: no gait disturbance or muscular weakness                                               General Appearance    A and O x " 4, Cooperative, no distress   Breasts    Abdomen   Deferred    Soft, non-tender, bowel sounds active all four quadrants,  no masses, no organomegaly    Genitourinary:   External rectal exam shows no thrombosed external hemorrhoids.   Pelvic exam was performed with patient supine.  No labial fusion.  There is no rash, lesion or injury on the right labia.  There is no rash, lesion or injury on the left labia.  No bleeding and no signs of injury around the vaginal introitus, urethra is without lesions and well supported. The cervix is visualized with no discharge, lesions or friability.  No vaginal discharge found.  No significant Cystocele, Enterocele or rectocele, and uterus well supported.  Bimanual exam:  The urethra is normal to palpation and there are no palpable vaginal wall masses.  Uterus is not deviated, retroverted and slightly enlarged  Cervix exhibits no motion tenderness.   Right adnexum displays no mass and no tenderness.  Left adnexum displays no mass and no tenderness.   Extremities: Extremities normal, atraumatic, no cyanosis or edema                     NOTE  NURSING PERSONAL PRESENT FOR ENTIRE PHYSICAL EXAM     PAP and cervical cultures submitted    I discussed slightly enlarged uterus and suspect small fibroid likley  This in fact may be the sensation pt describes  I rec a pelvic u/s to assess further   No overt cervical abnormality noted  Answered all questions and will follow

## 2023-01-26 ENCOUNTER — TELEPHONE (OUTPATIENT)
Dept: OBSTETRICS AND GYNECOLOGY | Facility: CLINIC | Age: 28
End: 2023-01-26
Payer: MEDICAID

## 2023-01-26 LAB
HAV IGM SERPL QL IA: NORMAL
HBV CORE IGM SERPL QL IA: NORMAL
HBV SURFACE AG SERPL QL IA: NORMAL
HCV AB SERPL QL IA: NORMAL

## 2023-01-26 NOTE — TELEPHONE ENCOUNTER
----- Message from Gabriel Arguelles sent at 1/26/2023  3:50 PM CST -----  Contact: Self  Type: Needs Medical Advice  Who Called:  Patient    Best Call Back Number: [phone]   Additional Information: Called to see if a pregnancy test was done alongside 1/25 labs.

## 2023-01-27 LAB
HSV1 GG IGG SER-ACNC: 0.14 IV
HSV2 GG IGG SER-ACNC: 0.12 IV

## 2023-01-28 LAB
C TRACH DNA SPEC QL NAA+PROBE: NOT DETECTED
N GONORRHOEA DNA SPEC QL NAA+PROBE: NOT DETECTED

## 2023-01-30 LAB — HSV AB, IGM BY EIA: 0.61 INDEX

## 2023-04-10 ENCOUNTER — TELEPHONE (OUTPATIENT)
Dept: OBSTETRICS AND GYNECOLOGY | Facility: CLINIC | Age: 28
End: 2023-04-10
Payer: MEDICAID

## 2023-04-10 NOTE — TELEPHONE ENCOUNTER
----- Message from Rosario Guardado sent at 4/10/2023 10:07 AM CDT -----  Contact: self  Type:  RX Refill Request    Who Called: self  Refill or New Rx:refill  RX Name and Strength: Norgestimate- Ethinyl estradiol (ortho-cyclen) .25/35 mg/mcg (BIRTH CONTROL)  How is the patient currently taking it? (ex. 1XDay):as directed  Is this a 30 day or 90 day RX:as directed  Preferred Pharmacy with phone number:      [id number] [address] & [address] [address]  Phone: [phone] Fax: [phone]      Local or Mail Order:local  Ordering Provider:dr. taylor  Would the patient rather a call back or a response via [facility]? call  Best Call Back Number:[phone] (home)     Additional Information: pt would like  to refill rx because phar cant get in contact with her primary. Please advise and thank you.

## 2023-06-20 ENCOUNTER — PATIENT MESSAGE (OUTPATIENT)
Dept: RESEARCH | Facility: HOSPITAL | Age: 28
End: 2023-06-20
Payer: MEDICAID

## 2023-07-05 ENCOUNTER — PATIENT MESSAGE (OUTPATIENT)
Dept: RESEARCH | Facility: HOSPITAL | Age: 28
End: 2023-07-05
Payer: MEDICAID

## 2023-12-19 RX ORDER — NORGESTIMATE AND ETHINYL ESTRADIOL 0.25-0.035
1 KIT ORAL DAILY
Qty: 90 TABLET | Refills: 0 | Status: SHIPPED | OUTPATIENT
Start: 2023-12-19 | End: 2024-12-18

## 2023-12-19 NOTE — TELEPHONE ENCOUNTER
----- Message from Alex Barboza sent at 12/19/2023 12:36 PM CST -----  Regarding: pharm change med discussion  Contact: Silvia at [phone]  Type:  Pharmacy Calling to Clarify an RX    Name of Caller:  Silvia    Pharmacy Name:    [facility], [place], [place]    Prescription Name:  birth control [not on chart]    What do they need to clarify?:  pt states not using implant has been getting tab med from dr eduardo. Please call to discuss    Best Call Back Number:  [phone]    Additional Information:

## 2023-12-19 NOTE — TELEPHONE ENCOUNTER
Pt asking for a refill for her birth control prescription to get her to her appointment on 2/29/24. Original RX was filled on 4/10/23

## 2024-02-29 ENCOUNTER — OFFICE VISIT (OUTPATIENT)
Dept: OBSTETRICS AND GYNECOLOGY | Facility: CLINIC | Age: 29
End: 2024-02-29
Payer: MEDICAID

## 2024-02-29 VITALS
WEIGHT: 183.44 LBS | DIASTOLIC BLOOD PRESSURE: 72 MMHG | BODY MASS INDEX: 35.82 KG/M2 | SYSTOLIC BLOOD PRESSURE: 112 MMHG

## 2024-02-29 DIAGNOSIS — Z30.019 ENCOUNTER FOR INITIAL PRESCRIPTION OF CONTRACEPTIVES, UNSPECIFIED CONTRACEPTIVE: Primary | ICD-10-CM

## 2024-02-29 DIAGNOSIS — Z12.4 ENCOUNTER FOR PAP SMEAR OF CERVIX WITH HPV DNA COTESTING: ICD-10-CM

## 2024-02-29 PROCEDURE — 3074F SYST BP LT 130 MM HG: CPT | Mod: CPTII,,, | Performed by: SPECIALIST

## 2024-02-29 PROCEDURE — 99999 PR PBB SHADOW E&M-EST. PATIENT-LVL III: CPT | Mod: PBBFAC,,, | Performed by: SPECIALIST

## 2024-02-29 PROCEDURE — 88175 CYTOPATH C/V AUTO FLUID REDO: CPT | Performed by: SPECIALIST

## 2024-02-29 PROCEDURE — 99213 OFFICE O/P EST LOW 20 MIN: CPT | Mod: S$PBB,,, | Performed by: SPECIALIST

## 2024-02-29 PROCEDURE — 1159F MED LIST DOCD IN RCRD: CPT | Mod: CPTII,,, | Performed by: SPECIALIST

## 2024-02-29 PROCEDURE — 3078F DIAST BP <80 MM HG: CPT | Mod: CPTII,,, | Performed by: SPECIALIST

## 2024-02-29 PROCEDURE — 3008F BODY MASS INDEX DOCD: CPT | Mod: CPTII,,, | Performed by: SPECIALIST

## 2024-02-29 PROCEDURE — 99213 OFFICE O/P EST LOW 20 MIN: CPT | Mod: PBBFAC,PN | Performed by: SPECIALIST

## 2024-02-29 RX ORDER — NORGESTIMATE AND ETHINYL ESTRADIOL 0.25-0.035
1 KIT ORAL DAILY
Qty: 84 TABLET | Refills: 3 | Status: SHIPPED | OUTPATIENT
Start: 2024-02-29

## 2024-02-29 RX ORDER — NORGESTIMATE AND ETHINYL ESTRADIOL 0.25-0.035
1 KIT ORAL DAILY
COMMUNITY
Start: 2023-04-13 | End: 2024-02-29 | Stop reason: SDUPTHER

## 2024-02-29 NOTE — PROGRESS NOTES
27 yo BF presents for annual gyn eval and continued contraceptive management No overt gyn complaints  Past Medical History:   Diagnosis Date    Abnormal Pap smear of cervix     2011     Acid reflux     History of stomach ulcers     Hx of chlamydia infection        History reviewed. No pertinent surgical history.    Family History   Problem Relation Age of Onset    Fibroids Mother     Breast cancer Neg Hx     Colon cancer Neg Hx     Diabetes Neg Hx     Hypertension Neg Hx     Ovarian cancer Neg Hx     Uterine cancer Neg Hx     Cervical cancer Neg Hx        Social History     Socioeconomic History    Marital status: Single   Tobacco Use    Smoking status: Never    Smokeless tobacco: Never   Substance and Sexual Activity    Alcohol use: Yes     Comment: social    Drug use: No    Sexual activity: Yes     Partners: Male     Birth control/protection: Implant       Current Outpatient Medications   Medication Sig Dispense Refill    norgestimate-ethinyl estradioL (KRISTY) 0.25-35 mg-mcg per tablet Take 1 tablet by mouth once daily.      dicyclomine (BENTYL) 10 MG capsule Take 10 mg by mouth 4 (four) times daily before meals and nightly.      norgestimate-ethinyl estradioL (ORTHO-CYCLEN) 0.25-35 mg-mcg per tablet Take 1 tablet by mouth once daily. (Patient not taking: Reported on 2/29/2024) 90 tablet 0     No current facility-administered medications for this visit.       Review of patient's allergies indicates:  No Known Allergies    Review of System:   General: no chills, fever, night sweats, weight gain or weight loss  Psychological: no depression or suicidal ideation  Breasts: no new or changing breast lumps, nipple discharge or masses.  Respiratory: no cough, shortness of breath, or wheezing  Cardiovascular: no chest pain or dyspnea on exertion  Gastrointestinal: no abdominal pain, change in bowel habits, or black or bloody stools  Genito-Urinary: no incontinence, urinary frequency/urgency or vulvar/vaginal symptoms,  pelvic pain or abnormal vaginal bleeding.  Musculoskeletal: no gait disturbance or muscular weakness                                               General Appearance    A and O x 4, Cooperative, no distress   Breasts    Abdomen   Deferred    Soft, non-tender, bowel sounds active all four quadrants,  no masses, no organomegaly    Genitourinary:   External rectal exam shows no thrombosed external hemorrhoids.   Pelvic exam was performed with patient supine.  No labial fusion.  There is no rash, lesion or injury on the right labia.  There is no rash, lesion or injury on the left labia.  No bleeding and no signs of injury around the vaginal introitus, urethra is without lesions and well supported. The cervix is visualized with no discharge, lesions or friability.  No vaginal discharge found.  No significant Cystocele, Enterocele or rectocele, and uterus well supported.  Bimanual exam:  The urethra is normal to palpation and there are no palpable vaginal wall masses.  Uterus is not deviated, not enlarged, not fixed, normal shape and not tender.  Cervix exhibits no motion tenderness.   Right adnexum displays no mass and no tenderness.  Left adnexum displays no mass and no tenderness.   Extremities: Extremities normal, atraumatic, no cyanosis or edema                     NOTE  NURSING PERSONAL PRESENT FOR ENTIRE PHYSICAL EXAM     PAP submitted    Plan  BSE monthly  Pt desires to continue current OCP and will erefill  RTO 1 year/prn    I spent a total of 30 minutes on the day of the visit. This includes face to face time and non-face to face time preparing to see the patient (eg, review of tests), obtaining and/or reviewing separately obtained history, documenting clinical information in the electronic or other health record, independently interpreting results and communicating results to the patient/family/caregiver, or care coordinator.

## 2024-03-06 LAB
FINAL PATHOLOGIC DIAGNOSIS: NORMAL
Lab: NORMAL

## 2025-02-03 RX ORDER — NORGESTIMATE AND ETHINYL ESTRADIOL 0.25-0.035
1 KIT ORAL
Qty: 84 TABLET | Refills: 0 | Status: SHIPPED | OUTPATIENT
Start: 2025-02-03

## 2025-02-03 NOTE — TELEPHONE ENCOUNTER
Refill Decision Note   Silvia Bose  is requesting a refill authorization.  Brief Assessment and Rationale for Refill:  Approve     Medication Therapy Plan:         Comments:     Note composed:12:59 PM 02/03/2025